# Patient Record
Sex: FEMALE | Race: WHITE | URBAN - METROPOLITAN AREA
[De-identification: names, ages, dates, MRNs, and addresses within clinical notes are randomized per-mention and may not be internally consistent; named-entity substitution may affect disease eponyms.]

---

## 2023-12-11 ENCOUNTER — HOSPITAL ENCOUNTER (OUTPATIENT)
Dept: TELEMEDICINE | Facility: OTHER | Age: 63
Discharge: HOME | End: 2023-12-11
Payer: MEDICAID

## 2023-12-11 PROCEDURE — 99205 PR OFFICE/OUTPT VISIT, NEW, LEVL V, 60-74 MIN: ICD-10-PCS | Mod: AF,HB,95, | Performed by: PSYCHIATRY & NEUROLOGY

## 2023-12-11 PROCEDURE — 99205 OFFICE O/P NEW HI 60 MIN: CPT | Mod: AF,HB,95, | Performed by: PSYCHIATRY & NEUROLOGY

## 2023-12-12 NOTE — CONSULTS
"[facility]  Psychiatry  Telepsychiatry Consult Note    Please see previous notes:    Patient agreeable to consultation via telepsychiatry.    Tele-Consultation from Psychiatry started: 12/11/2023 at 2101  The chief complaint leading to psychiatric consultation is: psychosis  The location of the consulting psychiatrist is  [place], [place] .  The patient location is [facility]   Also present with the patient at the time of the consultation: none    Patient Identification:   Lisa Curran is a 63 y.o. female.    Patient information was obtained from patient and parent.  Patient presented voluntarily to the Emergency Department    Consults  Consult Start Time: 12/11/2023 21:01 CST  Consult End Time: 12/11/2023 21:31 CST        Subjective:     History of Present Illness:  Per RN: Intermittent psychotic episodes for 4 years. 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 Has been living . Increasingly erratic behavior. Defecating. No violence. Concerns for some dementia. Confusion got worse today.    Per Interview:  Patient says she is in the hospital because she thinks she is having check up. Patient endorses "fine" mood and  good sleep and ok appetite. Patient denies any sober period of sleep deficit associated with grandiosity/irritability, distractibility, impulsivity, racing thoughts, increased activity and talkativeness. The patient denies any current or history of SI/HI or any plan/intent to self-harm or harm others. Endorses VHs of people that others do not seee. Denies AH, paranoia. No vocalized delusions.    Collateral:  Cheryl Browne, daughter  Daughter is an ED nurse. 4.5 years ago,  passed away. Patient never worked. Patient moved into daughter's house. At that time, she was depressed. Started some medicine. Took her to a neurologist. 2 years ago, patient had Covid, and patient was in the bed for 8 days. Since then, patient has not been the same. " Started doing things out of the ordinary. She began not doing anything, not cleaning, leaving things all over the counter, throwing trash in the cabinets. Saw a psych PA; he said that he was puzzled. Started on Rexulti which did not do anything. Took her to [facility]. They were going to do a MRI 4 different times, but she refused to get in. After that, when daughter came to work, people watched patient while patient works. Patient's other daughter called this morning at 4am. Patient had taken the door off of the track; patient had pooped the kids' toy box and used the kids' clothes to clean herself. At home, patient has been defecating and putting the toilet paper in the cabinet. No SI/HI. Does not wander. Will not cook a meal. Never physically violent. Refuses to take a bath; will not allow a bath for the past week. No psych meds. No hallucinations. No paranoia. No delusions.    History provided by daughter:  Psychiatric History:   Previous Psychiatric Hospitalizations: No  Previous Medication Trials: Yes Rexulti, Lexapro-  Previous Suicide Attempts: no  History of Violence: no  History of Depression: yes  History of Mariel: no  History of Auditory/Visual Hallucination no  History of Delusions: no vocalized delusions  Outpatient psychiatrist (current & past): Not currently    Substance Abuse History:  Tobacco:No  Alcohol: occasionally used to  Illicit Substances:No  Detox/Rehab: No    Legal History: Past charges/incarcerations: No     Family Psychiatric History:   Brother-committed suicide  Daughter    Social History:  *Education: 9th grade  Employment Status/Finances:Unemployed   Relationship Status/Sexual Orientation:    Children: 3  Housing Status:  with daughter, son in law, grandson     history:  NO  Access to gun: NO    Psychiatric Mental Status Exam:  Arousal: alert  Sensorium/Orientation: oriented to person, place; disoriented to situation  Behavior/Cooperation: cooperative   Speech:  "normal tone, normal rate, normal pitch, normal volume  Language: grossly intact  Mood: " fine "   Affect:  confused  Thought Process: normal and logical  Thought Content:   Auditory hallucinations: NO  Visual hallucinations: YES: people that others may not see     Paranoia: NO  Delusions:  NO  Suicidal ideation: NO  Homicidal ideation: NO  Attention/Concentration:  Unable to spell WORLD forwards; unable to perform 20-3  Memory:    3/3 immediate, 0/3 at 5 min; 0/3 at 5 min with categories; 0/3 at 5 min with choices  Fund of Knowledge: estimated to be below average   Insight: poor awareness of illness  Judgment: limited      Past Medical History: No past medical history on file.   Laboratory Data: Labs Reviewed - No data to display    Neurological History:  Seizures: No  Head trauma: No    Allergies:   Review of patient's allergies indicates:  Not on File    Medications in ER: Medications - No data to display    Medications at home:     No current outpatient medications on file prior to encounter.         Assessment - Diagnosis - Goals:     Diagnosis/Impression: Patient is a 63yoF with past psychiatric history of depression who presents for bizarre behavior. Most recently, patient has been defecating in a closet and using clothes to clean herself; at home, she put used toilet paper into the cupboard. Patient is not bathing herself. Patient would not eat if daughter did not bring her food every day. For these reasons, will recommend inpatient psychiatric hospitalization.    Rec:   1. Dispo/Legal Status:  Cont [facility] at this time as the pt is currently gravely disabled. Seek inpt bed for pt safety and stabilization when/if medically cleared by the ER MD.  2. Scheduled Medications: none. Defer any non-psych meds to the ER MD.   3. PRN Medications: haloperidol 1-2mg po/I'm q6hr prn non-redirectable agitation associated with breakthrough psychosis or richard if needed to help the pt more effectively interact with " environment.  4. Precautions/Nursing:  standard PEC precautions  5. To-Do: Continue to observe pt's behavior while in the ER  6. Case Discussed with: ED physician     Time with patient: 8 minutes  In total, 30 minutes were spent on chart review, discussion with ED, patient interview and charting    More than 50% of the time was spent counseling/coordinating care    Consulting clinician was informed of the encounter and consult note.    Consultation ended: 12/11/2023 at 2131    Belem Gutierrez MD   Psychiatry  [facility]